# Patient Record
Sex: FEMALE | Race: WHITE | NOT HISPANIC OR LATINO
[De-identification: names, ages, dates, MRNs, and addresses within clinical notes are randomized per-mention and may not be internally consistent; named-entity substitution may affect disease eponyms.]

---

## 2022-10-03 ENCOUNTER — TRANSCRIPTION ENCOUNTER (OUTPATIENT)
Age: 87
End: 2022-10-03

## 2022-10-11 PROBLEM — Z00.00 ENCOUNTER FOR PREVENTIVE HEALTH EXAMINATION: Status: ACTIVE | Noted: 2022-10-11

## 2022-10-13 ENCOUNTER — APPOINTMENT (OUTPATIENT)
Dept: PULMONOLOGY | Facility: CLINIC | Age: 87
End: 2022-10-13

## 2022-10-13 VITALS — SYSTOLIC BLOOD PRESSURE: 110 MMHG | DIASTOLIC BLOOD PRESSURE: 70 MMHG | OXYGEN SATURATION: 94 % | HEART RATE: 64 BPM

## 2022-10-13 DIAGNOSIS — K57.32 DIVERTICULITIS OF LARGE INTESTINE W/OUT PERFORATION OR ABSCESS W/OUT BLEEDING: ICD-10-CM

## 2022-10-13 PROCEDURE — 99204 OFFICE O/P NEW MOD 45 MIN: CPT

## 2022-10-13 NOTE — PHYSICAL EXAM
[No Acute Distress] : no acute distress [Normal Appearance] : normal appearance [No Neck Mass] : no neck mass [Normal Rate/Rhythm] : normal rate/rhythm [Normal S1, S2] : normal s1, s2 [No Resp Distress] : no resp distress [Clear to Auscultation Bilaterally] : clear to auscultation bilaterally [No Abnormalities] : no abnormalities [No Clubbing] : no clubbing [No Cyanosis] : no cyanosis [No Focal Deficits] : no focal deficits [TextBox_89] : soft, not tender [TextBox_140] : awake

## 2022-10-13 NOTE — HISTORY OF PRESENT ILLNESS
[TextBox_4] : 87 year old female with dementia, HTN, HLD recently admitted to [facility] with fever and abdominal pain and was diagnosed with diverticulitis.\par Initial diagnosis was pneumonia and she was sent to my office for f/u\par CXR seen, my read: no infiltrates, no effusions\par CT abdomen lung windows seen: no infiltrates, no effusion\par \par She feels fine. Eating OK. No vomiting. Denies fever, chills.\par Hx is limited due to her dementia.\par I have seen the hospital notes from admitting note to d/c summary.\par Initially she was diagnosed with pneumonia but later CXR was normal and CT abdomen showed diverticulitis.\par She responded well to ABX and was d/c back to [facility]\par No prior pulmonary disease\par She is not on any pulmonary medications\par